# Patient Record
Sex: MALE | Race: WHITE | NOT HISPANIC OR LATINO | Employment: OTHER | URBAN - METROPOLITAN AREA
[De-identification: names, ages, dates, MRNs, and addresses within clinical notes are randomized per-mention and may not be internally consistent; named-entity substitution may affect disease eponyms.]

---

## 2019-07-17 ENCOUNTER — OFFICE VISIT (OUTPATIENT)
Dept: GASTROENTEROLOGY | Facility: CLINIC | Age: 79
End: 2019-07-17
Payer: MEDICARE

## 2019-07-17 VITALS
SYSTOLIC BLOOD PRESSURE: 150 MMHG | WEIGHT: 193 LBS | BODY MASS INDEX: 29.25 KG/M2 | DIASTOLIC BLOOD PRESSURE: 90 MMHG | HEART RATE: 110 BPM | HEIGHT: 68 IN

## 2019-07-17 DIAGNOSIS — R19.7 DIARRHEA, UNSPECIFIED TYPE: Primary | ICD-10-CM

## 2019-07-17 DIAGNOSIS — K21.9 GASTROESOPHAGEAL REFLUX DISEASE, ESOPHAGITIS PRESENCE NOT SPECIFIED: Primary | ICD-10-CM

## 2019-07-17 DIAGNOSIS — R19.7 DIARRHEA, UNSPECIFIED TYPE: ICD-10-CM

## 2019-07-17 PROCEDURE — 99204 OFFICE O/P NEW MOD 45 MIN: CPT | Performed by: INTERNAL MEDICINE

## 2019-07-17 RX ORDER — TAMSULOSIN HYDROCHLORIDE 0.4 MG/1
CAPSULE ORAL
Refills: 3 | COMMUNITY
Start: 2019-07-15

## 2019-07-17 RX ORDER — LAMOTRIGINE 25 MG/1
25 TABLET ORAL DAILY
COMMUNITY

## 2019-07-17 RX ORDER — GLUCOSA SU 2KCL/CHONDROITIN SU 500-400 MG
CAPSULE ORAL DAILY
COMMUNITY

## 2019-07-17 RX ORDER — ATORVASTATIN CALCIUM 20 MG/1
20 TABLET, FILM COATED ORAL DAILY
COMMUNITY

## 2019-07-17 RX ORDER — MONTELUKAST SODIUM 4 MG/1
1 TABLET, CHEWABLE ORAL 2 TIMES DAILY
Refills: 10 | COMMUNITY
Start: 2019-06-18 | End: 2021-04-30

## 2019-07-17 RX ORDER — ASPIRIN 81 MG/1
81 TABLET ORAL DAILY
COMMUNITY

## 2019-07-17 RX ORDER — MULTIVITAMIN
1 TABLET ORAL DAILY
COMMUNITY

## 2019-07-17 NOTE — PROGRESS NOTES
[facility] - Outpatient Consultation  Justyna Keita 70 y o  male MRN: [id number]  Encounter: [id number]    ASSESSMENT AND PLAN:      1  Gastroesophageal reflux disease, esophagitis presence not specified  Presently controlled    2  Diarrhea, unspecified type  72-year-old male with 6 months of watery diarrhea 2-3 stools a day  No rectal bleeding no antibiotics  No improvement with colestipol last colonoscopy 2018 was negative but no biopsies  The differential diagnosis includes bacterial overgrowth which I think is most likely  Other possibilities would be microscopic colitis or giardiasis  There is a small chance this could be celiac or bile salt diarrhea but both of these have been considered in the past evaluated  I would recommend a breath test for small bowel intestinal bacterial overgrowth  As well as a colonoscopy with biopsy  If all these are negative I would consider giardiasis  Followup Appointment[de-identified]  6 weeks  ______________________________________________________________________    Chief Complaint   Patient presents with    Diarrhea     pt wants 2nd opinion    Abdominal Pain       HPI:     Justyna Keita is a 70y o  year old male who presents with 6 months of diarrhea  The history is a combination of his recollections as well as a review of the lumen his medical records from [facility]  He reports that about 6 months ago he began to develop 2-3 loose stools a day particularly in the morning and sometimes at night before going to bed  The stools are watery sometimes associated with crampy abdominal pain  No blood he has had no weight loss  He has noticed no association with eating  No antibiotics no fever sweats or chills  He has chronic gastroesophageal reflux and a history of gastroparesis but these symptoms have largely been controlled he has had no nausea  He has a trial of colestipol without any improvement        Historical Information   Past Medical History:   Diagnosis Date    Gastroparesis     GERD (gastroesophageal reflux disease)     Hyperlipidemia     Hypertension      Past Surgical History:   Procedure Laterality Date    CHOLECYSTECTOMY      GASTRIC FUNDOPLICATION       Social History     Substance and Sexual Activity   Alcohol Use Not Currently     Social History     Substance and Sexual Activity   Drug Use Not on file     Social History     Tobacco Use   Smoking Status Never Smoker   Smokeless Tobacco Never Used     Family History   Problem Relation Age of Onset    Dementia Mother     Stroke Father        Meds/Allergies     Current Outpatient Medications:     aspirin (ECOTRIN LOW STRENGTH) 81 mg EC tablet    atorvastatin (LIPITOR) 20 mg tablet    Coenzyme Q10 (CO Q10) 100 MG CAPS    colestipol (COLESTID) 1 g tablet    lamoTRIgine (LaMICtal) 25 mg tablet    Multiple Vitamin (MULTIVITAMIN) tablet    tamsulosin (FLOMAX) 0 4 mg    No Known Allergies    PHYSICAL EXAM:    Blood pressure 150/90, pulse (!) 110, height 5' 8" (1 727 m), weight 87 5 kg (193 lb)  Body mass index is 29 35 kg/m²  General Appearance: NAD, cooperative, alert  HEENT:  Normocephalic, atraumatic, anicteric  Neck:  Supple, symmetrical, trachea midline  Lungs:  Clear to auscultation bilaterally; no rales, rhonchi or wheezing; respirations unlabored   Heart[de-identified]  Regular rate and rhythm; no murmur, rub, or gallop    Abdomen:  Soft, non-tender, non-distended; normal bowel sounds; no masses, no organomegaly   Rectal:  Deferred   Extremities: No cyanosis, clubbing or edema   Skin:  No jaundice, rashes, or lesions   Lymph nodes: No palpable cervical lymphadenopathy  Psych: Normal affect, good eye contact  Neuro: No gross deficits, AAOx3    Lab Results:   No results found for: WBC, HGB, HCT, MCV, PLT  No results found for: NA, K, CL, CO2, ANIONGAP, BUN, CREATININE, GLUCOSE, GLUF, CALCIUM, CORRECTEDCA, AST, ALT, ALKPHOS, PROT, BILITOT, EGFR  No results found for: IRON, TIBC, FERRITIN  No results found for: LIPASE    Radiology Results:   No results found  REVIEW OF SYSTEMS:    CONSTITUTIONAL: Denies any fever, chills, rigors, and weight loss  HEENT: No earache or tinnitus  Denies hearing loss or visual disturbances  CARDIOVASCULAR: No chest pain or palpitations  RESPIRATORY: Denies any cough, hemoptysis, shortness of breath or dyspnea on exertion  GASTROINTESTINAL: As noted in the History of Present Illness  GENITOURINARY: No problems with urination  Denies any hematuria or dysuria  NEUROLOGIC: No dizziness or vertigo, denies headaches  MUSCULOSKELETAL: Denies any muscle or joint pain  SKIN: Denies skin rashes or itching  ENDOCRINE: Denies excessive thirst  Denies intolerance to heat or cold  PSYCHOSOCIAL: Denies depression or anxiety  Denies any recent memory loss

## 2019-07-18 ENCOUNTER — CLINICAL SUPPORT (OUTPATIENT)
Dept: GASTROENTEROLOGY | Facility: CLINIC | Age: 79
End: 2019-07-18
Payer: MEDICARE

## 2019-07-18 DIAGNOSIS — R19.7 DIARRHEA, UNSPECIFIED TYPE: Primary | ICD-10-CM

## 2019-07-18 PROCEDURE — 91065 BREATH HYDROGEN/METHANE TEST: CPT | Performed by: INTERNAL MEDICINE

## 2019-07-18 NOTE — PROGRESS NOTES
[facility]  [address] [place] [address]   [phone]    Bacterial Overgrowth Analytical Record    Cristian Dias 70 y o  male MRN: [id number]      Date of Test: 07/18/2019    Substrate Given: Lactulose     Ordering Provider: Dr Sulaiman Childress Assistant: All    Symptoms: Diarrhea    The patient presents for bacterial overgrowth testing  Patient fasted overnight  Baseline readings obtained  substrate was administered, and the patient drink without difficulty  Breath test performed every 20 min for a total of 3 hr    Sample Clock Time ppmH2 ppmCH4 Co2% Jesús   Baseline 08:47am   34 36 4 9 1 12   #1  20 minutes 09:12am 34 43 5 3 1 03   #2  40 minutes 09:33am 40 51 4 7 1 17   #3  60 minutes 09:50am 40 44 3 7 1 48   #4  80 minutes 10:10am 38 55 4 0 1 37   #5  100 minutes 10:30am 32 53 4 7 1 17   #6  120 minutes 10:50am 28 46 4 4 1 25   #7  140 minutes 11:10am 30 46 4 3 1 27   #8  160 minutes 11:28am 33 52 4 6 1 19   #9  180 minutes 11:46am 28 43 3 7 1 48       Physician interpretation:  Equivocal, difficult to interpret with high baseline    Given the high pretest likelihood will likely treat with rifaximin

## 2019-07-18 NOTE — LETTER
July 24, 2019     Sarah Rodríguez MD  [address]    Patient: Abdirashid Evans   YOB: 1940   Date of Visit: 7/18/2019       Dear Dr Box [place]:    Thank you for referring Abdirashid Evans to me for evaluation  Below are my notes for this consultation  If you have questions, please do not hesitate to call me  I look forward to following your patient along with you  Sincerely,        Jay Saha        CC: No Recipients  Cherie Ruiz MD  7/24/2019  5:24 PM  Incomplete  [address] [facility]  [address] [facility] [address]   [phone]    Bacterial Overgrowth Analytical Record    Abdirashid Evans 70 y o  male MRN: [id number]      Date of Test: 07/18/2019    Substrate Given: Lactulose     Ordering Provider: Dr Asher Mcgraw Assistant: All    Symptoms: Diarrhea    The patient presents for bacterial overgrowth testing  Patient fasted overnight  Baseline readings obtained  substrate was administered, and the patient drink without difficulty  Breath test performed every 20 min for a total of 3 hr    Sample Clock Time ppmH2 ppmCH4 Co2% Jesús   Baseline 08:47am   34 36 4 9 1 12   #1  20 minutes 09:12am 34 43 5 3 1 03   #2  40 minutes 09:33am 40 51 4 7 1 17   #3  60 minutes 09:50am 40 44 3 7 1 48   #4  80 minutes 10:10am 38 55 4 0 1 37   #5  100 minutes 10:30am 32 53 4 7 1 17   #6  120 minutes 10:50am 28 46 4 4 1 25   #7  140 minutes 11:10am 30 46 4 3 1 27   #8  160 minutes 11:28am 33 52 4 6 1 19   #9  180 minutes 11:46am 28 43 3 7 1 48       Physician interpretation:  Equivocal, difficult to interpret with high baseline    Given the high pretest likelihood will likely treat with rifaximin        Cherie Ruiz MD  7/24/2019  5:20 PM  Sign at close encounter  [facility]  [address] [place] [id number]   [phone]    Bacterial Overgrowth Analytical Record    Abdirashid Evans 70 y o  male MRN: [id number]      Date of Test: 07/18/2019    Substrate Given: Lactulose     Ordering Provider: Dr Asher Mcgraw Assistant: All    Symptoms: Diarrhea    The patient presents for bacterial overgrowth testing  Patient fasted overnight  Baseline readings obtained  substrate was administered, and the patient drink without difficulty    Breath test performed every 20 min for a total of 3 hr    Sample Clock Time ppmH2 ppmCH4 Co2% Jesús   Baseline 08:47am   34 36 4 9 1 12   #1  20 minutes 09:12am 34 43 5 3 1 03   #2  40 minutes 09:33am 40 51 4 7 1 17   #3  60 minutes 09:50am 40 44 3 7 1 48   #4  80 minutes 10:10am 38 55 4 0 1 37   #5  100 minutes 10:30am 32 53 4 7 1 17   #6  120 minutes 10:50am 28 46 4 4 1 25   #7  140 minutes 11:10am 30 46 4 3 1 27   #8  160 minutes 11:28am 33 52 4 6 1 19   #9  180 minutes 11:46am 28 43 3 7 1 48       Physician interpretation:

## 2019-07-25 ENCOUNTER — LAB REQUISITION (OUTPATIENT)
Dept: LAB | Facility: HOSPITAL | Age: 79
End: 2019-07-25
Payer: MEDICARE

## 2019-07-25 DIAGNOSIS — K64.0 FIRST DEGREE HEMORRHOIDS: ICD-10-CM

## 2019-07-25 PROCEDURE — 88305 TISSUE EXAM BY PATHOLOGIST: CPT | Performed by: PATHOLOGY

## 2019-07-26 DIAGNOSIS — R19.7 DIARRHEA, UNSPECIFIED TYPE: Primary | ICD-10-CM

## 2019-07-26 NOTE — TELEPHONE ENCOUNTER
Pt's wife states [facility] said Ins will not cover the med Xifaxan; 28 pills are $1100; requests alternative   CB#  [phone]

## 2019-07-31 RX ORDER — AMOXICILLIN AND CLAVULANATE POTASSIUM 875; 125 MG/1; MG/1
1 TABLET, FILM COATED ORAL EVERY 12 HOURS SCHEDULED
Qty: 20 TABLET | Refills: 0 | Status: SHIPPED | OUTPATIENT
Start: 2019-07-31 | End: 2019-08-10

## 2019-07-31 NOTE — TELEPHONE ENCOUNTER
Pt's wife Renae left  mssg stating the 28 pills were $1100 and they thought there might be an alternative; ask for [phone]

## 2019-11-12 ENCOUNTER — TELEPHONE (OUTPATIENT)
Dept: GASTROENTEROLOGY | Facility: CLINIC | Age: 79
End: 2019-11-12

## 2019-11-12 NOTE — TELEPHONE ENCOUNTER
I called and left a vm suggesting they call tomorrow and schedule an office visit with Mel Browne  He was last seen for the colonoscopy in July, never had follow up  He was treated for SIBO with xifaxin; he may require another course of treatment  Again emphasized he should call to schedule for a sooner appt

## 2019-11-12 NOTE — TELEPHONE ENCOUNTER
Pt's wife Taylor Denise states he continues to experience abdominal pain every day all day long/mostly in the mornings  Noted Pt last seen in July; questioned her for info/she was vague; states he is not good at describing info; recommended making an appt/states could see NP or other Dr if it was a while to get in to [facility]  Call transf'd for OV  Pulled info up to see if appt had been made  Pt appt is not made until January    Reopening TE to send to clinical for questioning since she stated he has pain every day

## 2019-11-14 ENCOUNTER — OFFICE VISIT (OUTPATIENT)
Dept: GASTROENTEROLOGY | Facility: CLINIC | Age: 79
End: 2019-11-14
Payer: MEDICARE

## 2019-11-14 VITALS
SYSTOLIC BLOOD PRESSURE: 152 MMHG | BODY MASS INDEX: 29.55 KG/M2 | HEIGHT: 68 IN | HEART RATE: 86 BPM | DIASTOLIC BLOOD PRESSURE: 100 MMHG | WEIGHT: 195 LBS

## 2019-11-14 DIAGNOSIS — K21.9 GASTROESOPHAGEAL REFLUX DISEASE, ESOPHAGITIS PRESENCE NOT SPECIFIED: ICD-10-CM

## 2019-11-14 DIAGNOSIS — R10.32 LLQ PAIN: ICD-10-CM

## 2019-11-14 DIAGNOSIS — R14.0 ABDOMINAL BLOATING: ICD-10-CM

## 2019-11-14 DIAGNOSIS — R10.30 LOWER ABDOMINAL PAIN: ICD-10-CM

## 2019-11-14 DIAGNOSIS — R19.7 DIARRHEA, UNSPECIFIED TYPE: Primary | ICD-10-CM

## 2019-11-14 DIAGNOSIS — R19.4 RECENT CHANGE IN FREQUENCY OF BOWEL MOVEMENTS: ICD-10-CM

## 2019-11-14 PROCEDURE — 99214 OFFICE O/P EST MOD 30 MIN: CPT | Performed by: NURSE PRACTITIONER

## 2019-11-14 RX ORDER — MEMANTINE HYDROCHLORIDE 10 MG/1
10 TABLET ORAL 2 TIMES DAILY
COMMUNITY

## 2019-11-14 RX ORDER — DIPHENOXYLATE HYDROCHLORIDE AND ATROPINE SULFATE 2.5; .025 MG/1; MG/1
1 TABLET ORAL 4 TIMES DAILY PRN
Qty: 60 TABLET | Refills: 2 | Status: SHIPPED | OUTPATIENT
Start: 2019-11-14 | End: 2019-12-10 | Stop reason: SDUPTHER

## 2019-11-14 NOTE — PROGRESS NOTES
[facility] - Outpatient Follow-up Note  Cassandra Shown 70 y o  male MRN: [id number]  Encounter: [id number]    ASSESSMENT AND PLAN:      1  Diarrhea, unspecified type  2  Recent change in frequency of bowel movements  3  Abdominal bloating  4  LLQ pain  5  Lower abdominal pain  Two week history of increased frequency of watery diarrhea with lower abdominal pain localized to the L LQ  Although no diverticula were seen on previous colonoscopy, diverticulitis is a possibility as well as infectious or ischemic colitis, less likely microscopic colitis with negative biopsy per colonoscopy in July  Thyroid dysfunction is also a possibility as well as a viral gastroenteritis or other infectious etiology  Will check some labs including TSH  Stool studies including Giardia (previous consideration/concern per Dr Padmini Centeno), C diff, although lower suspicion and fecal calprotectin  Will also schedule CT of abdomen and pelvis with contrast since his recent creatinine level has normalized  Advised to maintain adequate oral fluid intake daily since he is having some periods of dizziness which may be due to dehydration with increased diarrhea  Will add Lomotil to help control stools that interfere with his daily activity  Further plan pending outcome of testing  This patient is average risk  His last colonoscopy was 07/25/2019 with normal colonic mucosa and grade 1 internal hemorrhoids  Pathology was negative for colitis or dysplasia  No recall advised for advanced age     - TSH, 3rd generation with Free T4 reflex; Future  - MISCELLANEOUS LAB TEST; Future  - Giardia antigen; Future  - Calprotectin,Fecal; Future  - Ova and parasite examination; Future  - Clostridium difficile toxin by PCR; Future  - CBC; Future  - CT abdomen pelvis w wo contrast; Future  - Comprehensive metabolic panel; Future  - diphenoxylate-atropine (LOMOTIL) 2 5-0 025 mg per tablet;  Take 1 tablet by mouth 4 (four) times a day as needed for diarrhea  Dispense: 60 tablet; Refill: 2    6  Gastroesophageal reflux disease, esophagitis presence not specified  Previous symptoms are well controlled with lifestyle modifications  Followup Appointment:  2 weeks  ______________________________________________________________________    Chief Complaint   Patient presents with    Diarrhea    Left abd pain     HPI:  51-year-old male patient who was recently diagnosed with dementia, started on Namenda, returns to the office, accompanied by his wife with worsening diarrhea with L LQ, lower abdominal pain over the past 2 weeks  The patient has a long history of chronic diarrhea which may be bile salt in nature, described as 2-3 watery stools daily  Two weeks ago the patient began experiencing continue lower abdominal pain localized to the L LQ described as dull rated 6 to 7/10  No particular aggravating or alleviating factors  At the same time, began having 3-5 urgent watery stools accompanied with abdominal bloating which has continued  Denies fever/chills, nausea or vomiting, recent antibiotics, travel or sick contacts  No dietary changes but did recently start Namenda for his dementia with 1 possible side effect as diarrhea  He was recently treated with Xifaxan for an equivocal breath test study but this did not change his usual stool character  Has not been taking the Colestid as he does not feel it helped  Denies melena, hematochezia, rectal pain, fecal incontinence or nocturnal stools  Denies fatigue, chest pain, dysphagia, odynophagia, heartburn, reflux or early satiety  Appetite is normal   Weight is stable      Historical Information   Past Medical History:   Diagnosis Date    Dementia ([facility] [address] )     Gastroparesis     GERD (gastroesophageal reflux disease)     Hiatal hernia     Hyperlipidemia     Hypertension     Trigeminal neuralgia of left side of face     from injury     Past Surgical History:   Procedure Laterality Date    CHOLECYSTECTOMY      COLONOSCOPY  07/25/2019    Grade 1 internal hemorrhoids with normal colonic mucosa  Pathology negative for colitis or dysplasia    GASTRIC FUNDOPLICATION      UPPER GASTROINTESTINAL ENDOSCOPY       Social History     Substance and Sexual Activity   Alcohol Use Not Currently    Frequency: Never    Binge frequency: Never     Social History     Substance and Sexual Activity   Drug Use Never     Social History     Tobacco Use   Smoking Status Never Smoker   Smokeless Tobacco Never Used     Family History   Problem Relation Age of Onset    Dementia Mother     Stroke Father     Colon polyps Neg Hx     Colon cancer Neg Hx          Current Outpatient Medications:     aspirin (ECOTRIN LOW STRENGTH) 81 mg EC tablet    atorvastatin (LIPITOR) 20 mg tablet    Coenzyme Q10 (CO Q10) 100 MG CAPS    lamoTRIgine (LaMICtal) 25 mg tablet    memantine (NAMENDA) 10 mg tablet    Multiple Vitamin (MULTIVITAMIN) tablet    tamsulosin (FLOMAX) 0 4 mg    colestipol (COLESTID) 1 g tablet    diphenoxylate-atropine (LOMOTIL) 2 5-0 025 mg per tablet  No Known Allergies  Reviewed medications and allergies and updated as indicated    ROS:  All systems reviewed and normals as noted per HPI  Positives also as listed; reports change in bowel habits, painful joints, swollen joints, leg cramps, chronic pain, itching, tingling/numbness  All other systems were reported as negative  PHYSICAL EXAM:    Blood pressure 152/100, pulse 86, height 5' 8" (1 727 m), weight 88 5 kg (195 lb)  Body mass index is 29 65 kg/m²  General Appearance: NAD, cooperative, alert, forgetful  Head:  Normocephalic, atraumatic  Eyes: Anicteric, PERRLA, EOMI  ENT:   normal external appearance, normal mucosa  Neck:  Supple, symmetrical, trachea midline  Resp:  Clear to auscultation bilaterally; no rales, rhonchi or wheezing; respirations unlabored   CV:  S1 S2, Regular rate and rhythm; no murmur, rub, or gallop    GI: Soft, tenderness in L LQ, across lower abdomen and RUQ with palpation, non-distended; normal bowel sounds; no masses, no organomegaly   Rectal: Deferred  Musculoskeletal: No cyanosis, clubbing or edema  Normal ROM  Skin:  No jaundice, rashes, or lesions   Heme/Lymph: No palpable cervical lymphadenopathy  Psych: Normal affect, good eye contact  Neuro: No gross deficits, AAOx3    Lab Results:  Reviewed lab results from October or November, 2019  No results found for: WBC, HGB, HCT, MCV, PLT  No results found for: NA, K, CL, CO2, ANIONGAP, BUN, CREATININE, GLUCOSE, GLUF, CALCIUM, CORRECTEDCA, AST, ALT, ALKPHOS, PROT, BILITOT, EGFR  No results found for: IRON, TIBC, FERRITIN  No results found for: LIPASE    Radiology Results:   No results found

## 2019-11-14 NOTE — PATIENT INSTRUCTIONS
Get the stool studies done then you may use the Lomotil as needed for diarrhea that interferes with her daily activities  Try doing a a liquid, bland diet for now  Make sure you are drinking plenty of fluids/water every day

## 2019-12-10 ENCOUNTER — OFFICE VISIT (OUTPATIENT)
Dept: GASTROENTEROLOGY | Facility: CLINIC | Age: 79
End: 2019-12-10
Payer: MEDICARE

## 2019-12-10 VITALS
HEIGHT: 68 IN | SYSTOLIC BLOOD PRESSURE: 118 MMHG | DIASTOLIC BLOOD PRESSURE: 68 MMHG | HEART RATE: 94 BPM | WEIGHT: 193 LBS | BODY MASS INDEX: 29.25 KG/M2

## 2019-12-10 DIAGNOSIS — R14.0 ABDOMINAL BLOATING: ICD-10-CM

## 2019-12-10 DIAGNOSIS — R19.7 DIARRHEA, UNSPECIFIED TYPE: Primary | ICD-10-CM

## 2019-12-10 DIAGNOSIS — R10.32 LLQ PAIN: ICD-10-CM

## 2019-12-10 DIAGNOSIS — R10.9 RIGHT SIDED ABDOMINAL PAIN: ICD-10-CM

## 2019-12-10 DIAGNOSIS — R10.30 LOWER ABDOMINAL PAIN: ICD-10-CM

## 2019-12-10 DIAGNOSIS — R19.4 RECENT CHANGE IN FREQUENCY OF BOWEL MOVEMENTS: ICD-10-CM

## 2019-12-10 DIAGNOSIS — K21.9 GASTROESOPHAGEAL REFLUX DISEASE, ESOPHAGITIS PRESENCE NOT SPECIFIED: ICD-10-CM

## 2019-12-10 PROCEDURE — 99214 OFFICE O/P EST MOD 30 MIN: CPT | Performed by: NURSE PRACTITIONER

## 2019-12-10 RX ORDER — DIPHENOXYLATE HYDROCHLORIDE AND ATROPINE SULFATE 2.5; .025 MG/1; MG/1
1 TABLET ORAL 4 TIMES DAILY PRN
Qty: 60 TABLET | Refills: 2 | Status: SHIPPED | OUTPATIENT
Start: 2019-12-10 | End: 2020-10-16

## 2019-12-10 NOTE — PATIENT INSTRUCTIONS
Continue to use the Lomotil for diarrhea that interferes with  your daily activities   you will have another colonoscopy here at our endoscopy center

## 2019-12-10 NOTE — PROGRESS NOTES
[facility] - Outpatient Follow-up Note  Noel Cortez 70 y o  male MRN: [id number]  Encounter: [id number]    ASSESSMENT AND PLAN:      1  Diarrhea, unspecified type  2  Recent change in frequency of bowel movements  3  Abdominal bloating  4  Right sided abdominal pain  5  Lower abdominal pain  7  LLQ pain  Continues with 4 to 6+ liquid stools daily  Stools taper off in the afternoon with the use of Lomotil so we will continue this  Stool studies were all negative including C diff, Giardia  TSH was normal   CT scan showed no evidence of diverticulitis, colitis, bowel inflammation, appendicitis  Previous breath testing was negative for SIBO  No pathology was seen on previous colonoscopy in July however fecal calprotectin was 942 6 when checked  This raises suspicion for IBD  Microscopic colitis is also a consideration  Case and plan was discussed with Dr Jerry Wakefield  Will repeat colonoscopy- EC     - diphenoxylate-atropine (LOMOTIL) 2 5-0 025 mg per tablet; Take 1 tablet by mouth 4 (four) times a day as needed for diarrhea  Dispense: 60 tablet; Refill: 2    6  Gastroesophageal reflux disease, esophagitis presence not specified  Previous symptoms are well controlled with lifestyle modifications  Followup Appointment:  4 weeks  ______________________________________________________________________    Chief Complaint   Patient presents with    Follow-up     labs/ct scan      HPI:  22-year-old male patient, recently diagnosed with dementia, taking them endo, returns to the office, accompanied by his wife, to follow up his worsening diarrhea which persists  The patient reports having at least 4-6 liquid stools daily beginning upon awakening  No relation to any food triggers or meals  The stool frequency has decreased throughout the day with the use of Lomotil  Denies melena, hematochezia, chest pain, UGI or alarm symptoms    The patient states he feels horrible noting weakness, feeling feverish with arthralgias  He also reports having a lot of flatus and feels somewhat bloated  No fevers or chills  Denies nausea or vomiting  Appetite is normal   Weight is stable  Historical Information   Past Medical History:   Diagnosis Date    Dementia ([address] )     Gastroparesis     GERD (gastroesophageal reflux disease)     Hiatal hernia     Hyperlipidemia     Hypertension     Trigeminal neuralgia of left side of face     from injury     Past Surgical History:   Procedure Laterality Date    CHOLECYSTECTOMY      COLONOSCOPY  07/25/2019    Grade 1 internal hemorrhoids with normal colonic mucosa  Pathology negative for colitis or dysplasia    GASTRIC FUNDOPLICATION      UPPER GASTROINTESTINAL ENDOSCOPY       Social History     Substance and Sexual Activity   Alcohol Use Not Currently    Frequency: Never    Binge frequency: Never     Social History     Substance and Sexual Activity   Drug Use Never     Social History     Tobacco Use   Smoking Status Never Smoker   Smokeless Tobacco Never Used     Family History   Problem Relation Age of Onset    Dementia Mother     Stroke Father     Colon polyps Neg Hx     Colon cancer Neg Hx          Current Outpatient Medications:     aspirin (ECOTRIN LOW STRENGTH) 81 mg EC tablet    atorvastatin (LIPITOR) 20 mg tablet    Coenzyme Q10 (CO Q10) 100 MG CAPS    colestipol (COLESTID) 1 g tablet    diphenoxylate-atropine (LOMOTIL) 2 5-0 025 mg per tablet    lamoTRIgine (LaMICtal) 25 mg tablet    memantine (NAMENDA) 10 mg tablet    Multiple Vitamin (MULTIVITAMIN) tablet    tamsulosin (FLOMAX) 0 4 mg  No Known Allergies  Reviewed medications and allergies and updated as indicated    PHYSICAL EXAM:    Blood pressure 118/68, pulse 94, height 5' 8" (1 727 m), weight 87 5 kg (193 lb)  Body mass index is 29 35 kg/m²    General Appearance: NAD, cooperative, alert  Eyes: Anicteric, PERRLA, EOMI  ENT:  Normocephalic, atraumatic, normal mucosa  Neck:  Supple, symmetrical, trachea midline  Resp:  Clear to auscultation bilaterally; no rales, rhonchi or wheezing; respirations unlabored   CV:  S1 S2, Regular rate and rhythm; no murmur, rub, or gallop  GI:  Soft, non-tender, non-distended; normal bowel sounds; no masses, no organomegaly   Rectal: Deferred  Musculoskeletal: No cyanosis, clubbing or edema  Normal ROM  Skin:  No jaundice, rashes, or lesions   Heme/Lymph: No palpable cervical lymphadenopathy  Psych: Normal affect, good eye contact  Neuro: No gross deficits, AAOx3    Lab Results:   No results found for: WBC, HGB, HCT, MCV, PLT  No results found for: NA, K, CL, CO2, ANIONGAP, BUN, CREATININE, GLUCOSE, GLUF, CALCIUM, CORRECTEDCA, AST, ALT, ALKPHOS, PROT, BILITOT, EGFR  No results found for: IRON, TIBC, FERRITIN  No results found for: LIPASE    Radiology Results:   No results found

## 2019-12-16 ENCOUNTER — TELEPHONE (OUTPATIENT)
Dept: GASTROENTEROLOGY | Facility: CLINIC | Age: 79
End: 2019-12-16

## 2019-12-16 NOTE — TELEPHONE ENCOUNTER
Pt left voicemail through answering service, needs to reschedule office visit on 01/09/2020   Please call back on #[phone]

## 2019-12-27 PROCEDURE — 88305 TISSUE EXAM BY PATHOLOGIST: CPT | Performed by: PATHOLOGY

## 2019-12-28 ENCOUNTER — LAB REQUISITION (OUTPATIENT)
Dept: LAB | Facility: HOSPITAL | Age: 79
End: 2019-12-28
Payer: MEDICARE

## 2019-12-28 DIAGNOSIS — K64.0 FIRST DEGREE HEMORRHOIDS: ICD-10-CM

## 2019-12-28 DIAGNOSIS — R19.7 DIARRHEA, UNSPECIFIED: ICD-10-CM

## 2019-12-28 DIAGNOSIS — K57.30 DIVERTICULOSIS OF LARGE INTESTINE WITHOUT PERFORATION OR ABSCESS WITHOUT BLEEDING: ICD-10-CM

## 2019-12-28 DIAGNOSIS — R10.32 LEFT LOWER QUADRANT PAIN: ICD-10-CM

## 2020-01-08 NOTE — RESULT ENCOUNTER NOTE
I spoke with the patient regarding the results which are essentially negative  No recall given age  ** still having pain and diarrhea  Not taking Lomotil  Can we move up his office visit so he can be seen in the next 1-2 weeks?   I think that is the next step is an office visit thanks

## 2020-01-09 ENCOUNTER — OFFICE VISIT (OUTPATIENT)
Dept: GASTROENTEROLOGY | Facility: CLINIC | Age: 80
End: 2020-01-09

## 2020-01-09 VITALS
HEART RATE: 73 BPM | HEIGHT: 68 IN | WEIGHT: 196 LBS | BODY MASS INDEX: 29.7 KG/M2 | SYSTOLIC BLOOD PRESSURE: 140 MMHG | DIASTOLIC BLOOD PRESSURE: 86 MMHG

## 2020-01-09 DIAGNOSIS — R14.0 ABDOMINAL BLOATING: ICD-10-CM

## 2020-01-09 DIAGNOSIS — Z12.11 SCREENING FOR COLON CANCER: ICD-10-CM

## 2020-01-09 DIAGNOSIS — K21.9 GASTROESOPHAGEAL REFLUX DISEASE, ESOPHAGITIS PRESENCE NOT SPECIFIED: ICD-10-CM

## 2020-01-09 DIAGNOSIS — R19.7 DIARRHEA, UNSPECIFIED TYPE: Primary | ICD-10-CM

## 2020-01-09 PROCEDURE — 99214 OFFICE O/P EST MOD 30 MIN: CPT | Performed by: INTERNAL MEDICINE

## 2020-01-09 RX ORDER — CIPROFLOXACIN 500 MG/1
500 TABLET, FILM COATED ORAL EVERY 12 HOURS SCHEDULED
Qty: 28 TABLET | Refills: 0 | Status: SHIPPED | OUTPATIENT
Start: 2020-01-09 | End: 2020-01-23

## 2020-01-09 NOTE — PROGRESS NOTES
[id number] - Outpatient Follow-up Note  Aldair Vasquez 70 y o  male MRN: [id number]  Encounter: [id number]    ASSESSMENT AND PLAN:      1  Diarrhea, unspecified type  Negative colonoscopy including biopsy just done  Hi fecal calprotectin previous breath test was equivocal but he did not respond Augmentin  I am concerned that we are missing something here  I feel confident that there is no pathology in the colon  This is his 3rd colonoscopy in a year and a half  Small bowel bacterial overgrowth would fit with the clinical picture I have elected to try a different broader spectrum antibiotic I have also ordered a small-bowel follow-through  I have also ordered celiac studies  - ciprofloxacin (CIPRO) 500 mg tablet; Take 1 tablet (500 mg total) by mouth every 12 (twelve) hours for 14 days  Dispense: 28 tablet; Refill: 0  - FL small bowel; Future  - Celiac Disease Comprehensive Panel; Future    2  Abdominal bloating  As above    3  Gastroesophageal reflux disease, esophagitis presence not specified  Stable asymptomatic    4  Screening for colon cancer  Negative colonoscopy just done      Followup Appointment:  3 months  ______________________________________________________________________    Chief Complaint   Patient presents with    Follow-up     scope      HPI:  Continues with 3 loose stools every morning somewhat watery  After that he is fine  Some abdominal bloating  He is eating weight stable appetite good  Historical Information   Past Medical History:   Diagnosis Date    Dementia ([address] )     Gastroparesis     GERD (gastroesophageal reflux disease)     Hiatal hernia     Hyperlipidemia     Hypertension     Trigeminal neuralgia of left side of face     from injury     Past Surgical History:   Procedure Laterality Date    CHOLECYSTECTOMY      COLONOSCOPY  07/25/2019    Grade 1 internal hemorrhoids with normal colonic mucosa    Pathology negative for colitis or dysplasia    GASTRIC FUNDOPLICATION      UPPER GASTROINTESTINAL ENDOSCOPY       Social History     Substance and Sexual Activity   Alcohol Use Not Currently    Frequency: 4 or more times a week    Binge frequency: Never     Social History     Substance and Sexual Activity   Drug Use Never     Social History     Tobacco Use   Smoking Status Never Smoker   Smokeless Tobacco Never Used     Family History   Problem Relation Age of Onset    Dementia Mother     Stroke Father     Colon polyps Neg Hx     Colon cancer Neg Hx          Current Outpatient Medications:     aspirin (ECOTRIN LOW STRENGTH) 81 mg EC tablet    atorvastatin (LIPITOR) 20 mg tablet    Coenzyme Q10 (CO Q10) 100 MG CAPS    colestipol (COLESTID) 1 g tablet    diphenoxylate-atropine (LOMOTIL) 2 5-0 025 mg per tablet    lamoTRIgine (LaMICtal) 25 mg tablet    memantine (NAMENDA) 10 mg tablet    Multiple Vitamin (MULTIVITAMIN) tablet    tamsulosin (FLOMAX) 0 4 mg    ciprofloxacin (CIPRO) 500 mg tablet    polyethylene glycol (COLYTE) 4000 mL solution  No Known Allergies  Reviewed medications and allergies and updated as indicated    PHYSICAL EXAM:    Blood pressure 140/86, pulse 73, height 5' 8" (1 727 m), weight 88 9 kg (196 lb)  Body mass index is 29 8 kg/m²  General Appearance: NAD, cooperative, alert  Eyes: Anicteric, PERRLA, EOMI  ENT:  Normocephalic, atraumatic, normal mucosa  Neck:  Supple, symmetrical, trachea midline  Rectal: Deferred  Musculoskeletal: No cyanosis, clubbing or edema  Normal ROM  Skin:  No jaundice, rashes, or lesions   Psych: Normal affect, good eye contact  Neuro: No gross deficits, AAOx3    Lab Results:   No results found for: WBC, HGB, HCT, MCV, PLT  No results found for: NA, K, CL, CO2, ANIONGAP, BUN, CREATININE, GLUCOSE, GLUF, CALCIUM, CORRECTEDCA, AST, ALT, ALKPHOS, PROT, BILITOT, EGFR  No results found for: IRON, TIBC, FERRITIN  No results found for: LIPASE    Radiology Results:   No results found

## 2020-02-12 ENCOUNTER — OFFICE VISIT (OUTPATIENT)
Dept: GASTROENTEROLOGY | Facility: CLINIC | Age: 80
End: 2020-02-12
Payer: MEDICARE

## 2020-02-12 ENCOUNTER — TELEPHONE (OUTPATIENT)
Dept: GASTROENTEROLOGY | Facility: CLINIC | Age: 80
End: 2020-02-12

## 2020-02-12 VITALS
BODY MASS INDEX: 29.86 KG/M2 | DIASTOLIC BLOOD PRESSURE: 96 MMHG | HEIGHT: 68 IN | HEART RATE: 99 BPM | WEIGHT: 197 LBS | SYSTOLIC BLOOD PRESSURE: 138 MMHG

## 2020-02-12 DIAGNOSIS — R10.32 LLQ ABDOMINAL PAIN: Primary | ICD-10-CM

## 2020-02-12 DIAGNOSIS — R19.7 DIARRHEA, UNSPECIFIED TYPE: ICD-10-CM

## 2020-02-12 DIAGNOSIS — Z12.11 COLON CANCER SCREENING: ICD-10-CM

## 2020-02-12 PROCEDURE — 99214 OFFICE O/P EST MOD 30 MIN: CPT | Performed by: NURSE PRACTITIONER

## 2020-02-12 RX ORDER — DICYCLOMINE HYDROCHLORIDE 10 MG/1
10 CAPSULE ORAL 2 TIMES DAILY PRN
Qty: 60 CAPSULE | Refills: 2 | Status: SHIPPED | OUTPATIENT
Start: 2020-02-12 | End: 2020-10-16

## 2020-02-12 RX ORDER — AMITRIPTYLINE HYDROCHLORIDE 10 MG/1
10 TABLET, FILM COATED ORAL
Qty: 30 TABLET | Refills: 1 | Status: SHIPPED | OUTPATIENT
Start: 2020-02-12 | End: 2021-04-30

## 2020-02-12 NOTE — PROGRESS NOTES
[id number] [facility] - Outpatient Follow-up Note  Milena Simental 68 y o  male MRN: [id number]  Encounter: [id number]    ASSESSMENT AND PLAN:      1  LLQ abdominal pain  Continues with LLQ abdominal pain  He reports that pain is worse after he moves his bowels  Patient had CT abdomen and pelvis in November 2019 which was negative for any acute pathology  He had a breath test in the past that was equivocal, completed a course of Augmentin without any improvement  When he was last seen with Dr Vivien Aceves on 01/09 he was prescribed Cipro  This did not improve his symptoms  Patient had colonoscopy on 12/27/19 which showed normal mucosa throughout the whole colon and biopsies were collected that were negative for microscopic colitis  Pt also had a small bowel follow through which showed prolonged transition time  Differential diagnoses include IBD (Crohn's disease)  - will check MRI enterography w wo; Future  - will trial dicyclomine (BENTYL) 10 mg capsule; Take 1 capsule (10 mg total) by mouth 2 (two) times a day as needed (abdominal cramping)  Dispense: 60 capsule; Refill: 2  - will trial amitriptyline (ELAVIL) 10 mg tablet; Take 1 tablet (10 mg total) by mouth daily at bedtime  Dispense: 30 tablet; Refill: 1  Discussed with pt that if he experiences any side effects to stop taking medication    - Basic metabolic panel; Future     2  Diarrhea, unspecified type  Continues with 1 episode of watery diarrhea in the morning  He states that abdominal pain worsen after he moves his bowels  He had stool studies completed in November that were negative  His fecal calprotectin was elevated at 942  Differential diagnoses include IBD (Crohn's disease)  -continue Lomotil b i d     3  Colon cancer screening  Last colonoscopy 12/27/2019  Showed grade 1 internal hemorrhoids and normal mucosa throughout the whole colon  No recall due to advanced age    Of note this is the patient's 3rd colonoscopy in the past year and a half  Case discussed with Dr Ann Bright  Followup Appointment: 4-6 weeks   ______________________________________________________________________    Chief Complaint   Patient presents with    LLQ pain    Diarrhea     sx continue    Hemorrhoids    Bloating/gas     sx worse in AM    Prostate pain     feels "achey"     HPI:  Sara Tapia is a 68y o  year old male who presents to the office today for continuation of left lower quadrant, diarrhea, bloating  Patient was recently seen in the office on 01/09 with Dr Mal Mccauley  He reports that his symptoms have not improved  He still is complaining of left lower quadrant abdominal pain that is constant and described as a dull ache  He also continues with watery diarrhea, has 1 loose bowel movement in the morning  He states that pain worsens after he moves his bowels  He denies any fever, chills, rectal bleeding, melena  Denies any upper GI symptoms  Denies any weight loss, and has actually experience weight gain over the past 2 weeks  His appetite is stable he does also report feeling "generally weak and shaky "       Historical Information   Past Medical History:   Diagnosis Date    Dementia ([address] )     Gastroparesis     GERD (gastroesophageal reflux disease)     Hiatal hernia     Hyperlipidemia     Hypertension     Trigeminal neuralgia of left side of face     from injury     Past Surgical History:   Procedure Laterality Date    CHOLECYSTECTOMY      COLONOSCOPY  07/25/2019    Grade 1 internal hemorrhoids with normal colonic mucosa    Pathology negative for colitis or dysplasia    GASTRIC FUNDOPLICATION      UPPER GASTROINTESTINAL ENDOSCOPY       Social History     Substance and Sexual Activity   Alcohol Use Not Currently    Frequency: 4 or more times a week    Binge frequency: Never     Social History     Substance and Sexual Activity   Drug Use Never     Social History     Tobacco Use   Smoking Status Never Smoker   Smokeless Tobacco Never Used     Family History   Problem Relation Age of Onset    Dementia Mother     Stroke Father     Colon polyps Neg Hx     Colon cancer Neg Hx          Current Outpatient Medications:     aspirin (ECOTRIN LOW STRENGTH) 81 mg EC tablet    atorvastatin (LIPITOR) 20 mg tablet    Coenzyme Q10 (CO Q10) 100 MG CAPS    colestipol (COLESTID) 1 g tablet    diphenoxylate-atropine (LOMOTIL) 2 5-0 025 mg per tablet    lamoTRIgine (LaMICtal) 25 mg tablet    memantine (NAMENDA) 10 mg tablet    Multiple Vitamin (MULTIVITAMIN) tablet    tamsulosin (FLOMAX) 0 4 mg    amitriptyline (ELAVIL) 10 mg tablet    dicyclomine (BENTYL) 10 mg capsule    polyethylene glycol (COLYTE) 4000 mL solution  No Known Allergies  Reviewed medications and allergies and updated as indicated    PHYSICAL EXAM:    Blood pressure 138/96, pulse 99, height 5' 8" (1 727 m), weight 89 4 kg (197 lb)  Body mass index is 29 95 kg/m²  General Appearance: NAD, cooperative, alert  Eyes: Anicteric, PERRLA, EOMI  ENT:  Normocephalic, atraumatic, normal mucosa  Neck:  Supple, symmetrical, trachea midline  Resp:  Clear to auscultation bilaterally; no rales, rhonchi or wheezing; respirations unlabored   CV:  S1 S2, Regular rate and rhythm; no murmur, rub, or gallop  GI:  Soft, non-tender, non-distended; normal bowel sounds; no masses, no organomegaly   Rectal: Deferred  Musculoskeletal: No cyanosis, clubbing or edema  Normal ROM  Skin:  No jaundice, rashes, or lesions   Heme/Lymph: No palpable cervical lymphadenopathy  Psych: Normal affect, good eye contact  Neuro: No gross deficits, AAOx3    Lab Results:   No results found for: WBC, HGB, HCT, MCV, PLT  No results found for: NA, K, CL, CO2, ANIONGAP, BUN, CREATININE, GLUCOSE, GLUF, CALCIUM, CORRECTEDCA, AST, ALT, ALKPHOS, PROT, BILITOT, EGFR  No results found for: IRON, TIBC, FERRITIN  No results found for: LIPASE    Radiology Results:   No results found

## 2020-02-12 NOTE — TELEPHONE ENCOUNTER
Pt's wife states Pt has had sev'l tests and continues w/ pain, hemorrhoids, gas and diarrhea/asks what to do? Pt in background/advised pain #8 is constant  Transf'd call to  for appt today/noted certainly if pain is severe he should consider ER

## 2020-02-12 NOTE — PATIENT INSTRUCTIONS
Arrange for MRE   Check blood work before MRI   Trial Bentyl as needed twice daily for pain   Start taking Amitriptyline 10 mg at night  If you have any side effects of dizziness, lightheadedness, extreme tiredness, please stop taking and notify our office  Follow up in 4-6 weeks

## 2020-04-01 ENCOUNTER — OFFICE VISIT (OUTPATIENT)
Dept: GASTROENTEROLOGY | Facility: CLINIC | Age: 80
End: 2020-04-01
Payer: MEDICARE

## 2020-04-01 VITALS — WEIGHT: 195 LBS | BODY MASS INDEX: 30.61 KG/M2 | HEIGHT: 67 IN

## 2020-04-01 DIAGNOSIS — K63.89 SMALL INTESTINAL BACTERIAL OVERGROWTH: Primary | ICD-10-CM

## 2020-04-01 PROCEDURE — 99214 OFFICE O/P EST MOD 30 MIN: CPT | Performed by: INTERNAL MEDICINE

## 2020-04-01 RX ORDER — METRONIDAZOLE 500 MG/1
500 TABLET ORAL 3 TIMES DAILY
Qty: 42 TABLET | Refills: 0 | Status: SHIPPED | OUTPATIENT
Start: 2020-04-01 | End: 2020-04-15

## 2020-04-01 RX ORDER — CEPHALEXIN 500 MG/1
500 CAPSULE ORAL 3 TIMES DAILY
Qty: 42 CAPSULE | Refills: 0 | Status: SHIPPED | OUTPATIENT
Start: 2020-04-01 | End: 2020-04-15

## 2020-07-16 ENCOUNTER — OFFICE VISIT (OUTPATIENT)
Dept: GASTROENTEROLOGY | Facility: CLINIC | Age: 80
End: 2020-07-16
Payer: MEDICARE

## 2020-07-16 VITALS
BODY MASS INDEX: 30.54 KG/M2 | DIASTOLIC BLOOD PRESSURE: 84 MMHG | TEMPERATURE: 98.3 F | WEIGHT: 195 LBS | SYSTOLIC BLOOD PRESSURE: 158 MMHG

## 2020-07-16 DIAGNOSIS — R19.7 DIARRHEA, UNSPECIFIED TYPE: ICD-10-CM

## 2020-07-16 DIAGNOSIS — K63.89 SMALL INTESTINAL BACTERIAL OVERGROWTH: Primary | ICD-10-CM

## 2020-07-16 DIAGNOSIS — Z12.11 COLON CANCER SCREENING: ICD-10-CM

## 2020-07-16 PROCEDURE — 99214 OFFICE O/P EST MOD 30 MIN: CPT | Performed by: INTERNAL MEDICINE

## 2020-07-16 NOTE — PROGRESS NOTES
[id number] - Outpatient Follow-up Note  Valente Velez 68 y o  male MRN: [id number]  Encounter: [id number]    ASSESSMENT AND PLAN:      1  Small intestinal bacterial overgrowth  Positive breath test not sure his symptoms have responded to antibiotic course x2    2  Colon cancer screening  Up-to-date negative colonoscopy 2019    3  Diarrhea, unspecified type  In talking with him I am not sure I would really classify his symptoms as diarrhea he is having 2-3 broken up stools in the morning and then nothing for the next 20 hours  Extensive negative workup including colonoscopy with biopsy celiac studies all of which were nondiagnostic the only positive test we have is the breath test and his response to antibiotics has been equivocal at best   At this point I am inclined to observe him he has no alarm symptoms  I asked him to contact me if things get worse I will see him in follow-up in about 6 months      Followup Appointment:  6 months  ______________________________________________________________________    Chief Complaint   Patient presents with    GERD     follow up     Diarrhea     HPI:  Doing well the last few weeks  He is not sure if the antibiotics helped his diarrhea or not  In questioning him his symptoms when they do bother him are 2-3 loose bowel movements not watery but broken up in the morning when he gets up  After that he is usually good until the next day  No abdominal pain recently no weight loss  Historical Information   Past Medical History:   Diagnosis Date    Dementia ([address] )     Gastroparesis     GERD (gastroesophageal reflux disease)     Hiatal hernia     Hyperlipidemia     Hypertension     Trigeminal neuralgia of left side of face     from injury     Past Surgical History:   Procedure Laterality Date    CHOLECYSTECTOMY      COLONOSCOPY  12/27/2019    Grade 1 internal hemorrhoids with normal colonic mucosa    Pathology negative for colitis or dysplasia    GASTRIC FUNDOPLICATION      UPPER GASTROINTESTINAL ENDOSCOPY       Social History     Substance and Sexual Activity   Alcohol Use Not Currently    Frequency: 4 or more times a week    Binge frequency: Never     Social History     Substance and Sexual Activity   Drug Use Never     Social History     Tobacco Use   Smoking Status Never Smoker   Smokeless Tobacco Never Used     Family History   Problem Relation Age of Onset    Dementia Mother     Stroke Father     Colon polyps Neg Hx     Colon cancer Neg Hx          Current Outpatient Medications:     amitriptyline (ELAVIL) 10 mg tablet    aspirin (ECOTRIN LOW STRENGTH) 81 mg EC tablet    atorvastatin (LIPITOR) 20 mg tablet    Coenzyme Q10 (CO Q10) 100 MG CAPS    colestipol (COLESTID) 1 g tablet    diphenoxylate-atropine (LOMOTIL) 2 5-0 025 mg per tablet    lamoTRIgine (LaMICtal) 25 mg tablet    memantine (NAMENDA) 10 mg tablet    Multiple Vitamin (MULTIVITAMIN) tablet    tamsulosin (FLOMAX) 0 4 mg    dicyclomine (BENTYL) 10 mg capsule    polyethylene glycol (COLYTE) 4000 mL solution  No Known Allergies  Reviewed medications and allergies and updated as indicated    PHYSICAL EXAM:    Blood pressure 158/84, temperature 98 3 °F (36 8 °C), weight 88 5 kg (195 lb)  Body mass index is 30 54 kg/m²  General Appearance: NAD, cooperative, alert  Eyes: Anicteric, PERRLA, EOMI  ENT:  Normocephalic, atraumatic, normal mucosa  Neck:  Supple, symmetrical, trachea midline  Resp:  Clear to auscultation bilaterally; no rales, rhonchi or wheezing; respirations unlabored   CV:  S1 S2, Regular rate and rhythm; no murmur, rub, or gallop  GI:  Soft, non-tender, non-distended; normal bowel sounds; no masses, no organomegaly   Rectal: Deferred  Musculoskeletal: No cyanosis, clubbing or edema  Normal ROM    Skin:  No jaundice, rashes, or lesions   Heme/Lymph: No palpable cervical lymphadenopathy  Psych: Normal affect, good eye contact  Neuro: No gross deficits, AAOx3    Lab Results:   No results found for: WBC, HGB, HCT, MCV, PLT  No results found for: NA, K, CL, CO2, ANIONGAP, BUN, CREATININE, GLUCOSE, GLUF, CALCIUM, CORRECTEDCA, AST, ALT, ALKPHOS, PROT, BILITOT, EGFR  No results found for: IRON, TIBC, FERRITIN  No results found for: LIPASE    Radiology Results:   No results found

## 2020-10-13 ENCOUNTER — TELEPHONE (OUTPATIENT)
Dept: GASTROENTEROLOGY | Facility: CLINIC | Age: 80
End: 2020-10-13

## 2020-10-16 ENCOUNTER — OFFICE VISIT (OUTPATIENT)
Dept: GASTROENTEROLOGY | Facility: CLINIC | Age: 80
End: 2020-10-16
Payer: MEDICARE

## 2020-10-16 VITALS
DIASTOLIC BLOOD PRESSURE: 80 MMHG | TEMPERATURE: 97.8 F | HEIGHT: 67 IN | SYSTOLIC BLOOD PRESSURE: 122 MMHG | WEIGHT: 195 LBS | BODY MASS INDEX: 30.61 KG/M2 | HEART RATE: 77 BPM

## 2020-10-16 DIAGNOSIS — R10.9 ABDOMINAL PAIN, UNSPECIFIED ABDOMINAL LOCATION: ICD-10-CM

## 2020-10-16 DIAGNOSIS — Z12.11 COLON CANCER SCREENING: ICD-10-CM

## 2020-10-16 DIAGNOSIS — R19.7 DIARRHEA, UNSPECIFIED TYPE: Primary | ICD-10-CM

## 2020-10-16 PROCEDURE — 99213 OFFICE O/P EST LOW 20 MIN: CPT | Performed by: NURSE PRACTITIONER

## 2020-10-16 RX ORDER — MULTIVIT WITH MINERALS/LUTEIN
1000 TABLET ORAL DAILY
COMMUNITY

## 2020-10-16 RX ORDER — DICYCLOMINE HYDROCHLORIDE 10 MG/1
10 CAPSULE ORAL EVERY 6 HOURS PRN
Qty: 90 CAPSULE | Refills: 1 | Status: SHIPPED | OUTPATIENT
Start: 2020-10-16 | End: 2020-11-13

## 2020-10-16 RX ORDER — SIMETHICONE 125 MG
250 TABLET,CHEWABLE ORAL DAILY
COMMUNITY

## 2020-11-13 ENCOUNTER — OFFICE VISIT (OUTPATIENT)
Dept: GASTROENTEROLOGY | Facility: CLINIC | Age: 80
End: 2020-11-13
Payer: MEDICARE

## 2020-11-13 ENCOUNTER — TELEPHONE (OUTPATIENT)
Dept: GASTROENTEROLOGY | Facility: CLINIC | Age: 80
End: 2020-11-13

## 2020-11-13 VITALS
BODY MASS INDEX: 30.98 KG/M2 | HEART RATE: 84 BPM | TEMPERATURE: 97.5 F | SYSTOLIC BLOOD PRESSURE: 138 MMHG | DIASTOLIC BLOOD PRESSURE: 88 MMHG | WEIGHT: 197.4 LBS | HEIGHT: 67 IN

## 2020-11-13 DIAGNOSIS — R10.30 LOWER ABDOMINAL PAIN: ICD-10-CM

## 2020-11-13 DIAGNOSIS — Z12.11 COLON CANCER SCREENING: ICD-10-CM

## 2020-11-13 DIAGNOSIS — K21.9 GASTROESOPHAGEAL REFLUX DISEASE WITHOUT ESOPHAGITIS: ICD-10-CM

## 2020-11-13 DIAGNOSIS — R19.7 DIARRHEA, UNSPECIFIED TYPE: ICD-10-CM

## 2020-11-13 DIAGNOSIS — R10.10 UPPER ABDOMINAL PAIN: Primary | ICD-10-CM

## 2020-11-13 PROCEDURE — 99214 OFFICE O/P EST MOD 30 MIN: CPT | Performed by: NURSE PRACTITIONER

## 2020-11-13 RX ORDER — FAMOTIDINE 40 MG/1
40 TABLET, FILM COATED ORAL
Qty: 30 TABLET | Refills: 2 | Status: SHIPPED | OUTPATIENT
Start: 2020-11-13 | End: 2020-12-14 | Stop reason: SDUPTHER

## 2020-12-14 ENCOUNTER — OFFICE VISIT (OUTPATIENT)
Dept: GASTROENTEROLOGY | Facility: CLINIC | Age: 80
End: 2020-12-14
Payer: MEDICARE

## 2020-12-14 VITALS
HEIGHT: 67 IN | BODY MASS INDEX: 30.92 KG/M2 | HEART RATE: 58 BPM | WEIGHT: 197 LBS | SYSTOLIC BLOOD PRESSURE: 140 MMHG | DIASTOLIC BLOOD PRESSURE: 94 MMHG

## 2020-12-14 DIAGNOSIS — R10.30 LOWER ABDOMINAL PAIN: ICD-10-CM

## 2020-12-14 DIAGNOSIS — Z12.11 COLON CANCER SCREENING: ICD-10-CM

## 2020-12-14 DIAGNOSIS — K21.9 GASTROESOPHAGEAL REFLUX DISEASE WITHOUT ESOPHAGITIS: ICD-10-CM

## 2020-12-14 DIAGNOSIS — R19.7 DIARRHEA, UNSPECIFIED TYPE: Primary | ICD-10-CM

## 2020-12-14 DIAGNOSIS — R10.10 UPPER ABDOMINAL PAIN: ICD-10-CM

## 2020-12-14 PROCEDURE — 99214 OFFICE O/P EST MOD 30 MIN: CPT | Performed by: NURSE PRACTITIONER

## 2020-12-14 RX ORDER — FAMOTIDINE 40 MG/1
40 TABLET, FILM COATED ORAL
Qty: 30 TABLET | Refills: 5 | Status: SHIPPED | OUTPATIENT
Start: 2020-12-14 | End: 2021-04-30

## 2021-04-27 ENCOUNTER — TELEPHONE (OUTPATIENT)
Dept: GASTROENTEROLOGY | Facility: CLINIC | Age: 81
End: 2021-04-27

## 2021-04-27 NOTE — TELEPHONE ENCOUNTER
I called and spoke with Dom, he was seen by his PCP today ( Dr Lisa Hartmann) Blood work was ordered for tomorrow at [facility]  Patient complaining of a HA, mid abd pain left >right Pain "it hurts" same level as umbilicus  discontinued taking the levsin a couple months ago as it was not effective, ongoing diarrhea 2-3 bm's per day (loose watery)  Denies fever, chills, nausea, vomiting, recent travel or covid exposure  I advised we will place patient on waiting list for a sooner appointment than July

## 2021-04-27 NOTE — TELEPHONE ENCOUNTER
Pt's wife, Dominick Bradshaw, called to sched appt/was transf'd to Nrsg line  She states he had an appt but didn't need it so she cancelled it  Now Abd pain ret'd/rates #7 this morn; had called primary, who ord'd fasting BW to be done tomorrow  CB# [phone]

## 2021-04-30 ENCOUNTER — OFFICE VISIT (OUTPATIENT)
Dept: GASTROENTEROLOGY | Facility: CLINIC | Age: 81
End: 2021-04-30
Payer: MEDICARE

## 2021-04-30 VITALS
HEIGHT: 67 IN | BODY MASS INDEX: 31.23 KG/M2 | SYSTOLIC BLOOD PRESSURE: 140 MMHG | DIASTOLIC BLOOD PRESSURE: 82 MMHG | WEIGHT: 199 LBS

## 2021-04-30 DIAGNOSIS — R10.9 RIGHT SIDED ABDOMINAL PAIN: Primary | ICD-10-CM

## 2021-04-30 PROCEDURE — 99214 OFFICE O/P EST MOD 30 MIN: CPT | Performed by: INTERNAL MEDICINE

## 2021-04-30 RX ORDER — DICYCLOMINE HYDROCHLORIDE 10 MG/1
20 CAPSULE ORAL 2 TIMES DAILY
Qty: 60 CAPSULE | Refills: 2 | Status: SHIPPED | OUTPATIENT
Start: 2021-04-30

## 2021-04-30 NOTE — LETTER
April 30, 2021     MD [address]  [address] [facility]    Patient: Bharath Cardenas   YOB: 1940   Date of Visit: 4/30/2021       Dear Dr Wadie Gilford:    Thank you for referring Bharath Cardenas to me for evaluation  Below are my notes for this consultation  If you have questions, please do not hesitate to call me  I look forward to following your patient along with you  Sincerely,        Bridgett Gresham MD        CC: No Recipients  Bridgett Gresham MD  4/30/2021 11:47 AM  Sign when Signing Visit  [facility] - Outpatient Follow-up Note  Bharath Cardenas 80 y o  male MRN: [id number]  Encounter: [id number]    ASSESSMENT AND PLAN:      1  Right sided abdominal pain  Right sided abdominal pain  Probably exacerbation of underlying functional bowel disease more than anything  CT scan from 2019 though with of right inguinal hernia and he is tender in this area  I doubt hernia accounts for all of his GI symptoms though  Blood work normal   Extensive workup in 2019 including colonoscopy, CT scan, breath test, & MR enterography  - CT abdomen pelvis w contrast; Future  - start dicyclomine (BENTYL) 10 mg capsule; Take 2 capsules (20 mg total) by mouth 2 (two) times a day  Dispense: 60 capsule; Refill: 2  - call me next week with an update and to review the role to the CT scan      Followup Appointment:  2 months  ______________________________________________________________________    Chief Complaint   Patient presents with    Abdominal Pain     HPI:  89year-old gentleman well known to our service with a history of functional bowel disease and some component of bacterial overgrowth  He had extensive evaluation in 2019  He was last seen by us in December  At that time he was on Levsin, probiotics, fiber, and Pepcid  Currently he is not taking any of those medications  He is seen in the office secondary to worsening abdominal symptoms    He is somewhat vague with his complaints but it seems like he has increasing issues with lower quadrant abdominal pain  He continues to have E retic bowel movements  He denies any upper GI symptoms  He denies any GI bleeding  His weight is increasing  Generally feels that his abdominal pain is somehow affecting the rest of his body  He was in to see his PCP and had blood work that was normal     Historical Information   Past Medical History:   Diagnosis Date    Dementia ([address] )     Gastroparesis     GERD (gastroesophageal reflux disease)     Hiatal hernia     Hyperlipidemia     Hypertension     Trigeminal neuralgia of left side of face     from injury     Past Surgical History:   Procedure Laterality Date    CHOLECYSTECTOMY      COLONOSCOPY  12/27/2019    Grade 1 internal hemorrhoids with normal colonic mucosa    Pathology negative for colitis or dysplasia    GASTRIC FUNDOPLICATION      UPPER GASTROINTESTINAL ENDOSCOPY       Social History     Substance and Sexual Activity   Alcohol Use Not Currently    Frequency: 4 or more times a week    Binge frequency: Never     Social History     Substance and Sexual Activity   Drug Use Never     Social History     Tobacco Use   Smoking Status Never Smoker   Smokeless Tobacco Never Used     Family History   Problem Relation Age of Onset    Dementia Mother     Stroke Father     Colon polyps Neg Hx     Colon cancer Neg Hx          Current Outpatient Medications:     Ascorbic Acid (vitamin C) 1000 MG tablet    aspirin (ECOTRIN LOW STRENGTH) 81 mg EC tablet    atorvastatin (LIPITOR) 20 mg tablet    B Complex-Folic Acid (B COMPLEX-VITAMIN B12 PO)    Coenzyme Q10 (CO Q10) 100 MG CAPS    hyoscyamine (LEVSIN/SL) 0 125 mg SL tablet    lamoTRIgine (LaMICtal) 25 mg tablet    memantine (NAMENDA) 10 mg tablet    Multiple Vitamin (MULTIVITAMIN) tablet    tamsulosin (FLOMAX) 0 4 mg    dicyclomine (BENTYL) 10 mg capsule    simethicone (MYLICON) 820 MG chewable tablet  No Known Allergies  Reviewed medications and allergies and updated as indicated    PHYSICAL EXAM:    Blood pressure 140/82, height 5' 7" (1 702 m), weight 90 3 kg (199 lb)  Body mass index is 31 17 kg/m²  General Appearance: NAD, cooperative, alert  Eyes: Anicteric, PERRLA, EOMI  ENT:  Normocephalic, atraumatic, normal mucosa  Neck:  Supple, symmetrical, trachea midline  Resp:  Clear to auscultation bilaterally; no rales, rhonchi or wheezing; respirations unlabored   CV:  S1 S2, Regular rate and rhythm; no murmur, rub, or gallop  GI:  Soft, moderate right lower quadrant tenderness without rebound or guarding, non-distended; normal bowel sounds; no masses, no organomegaly   Rectal: Deferred  Musculoskeletal: No cyanosis, clubbing or edema  Normal ROM  Skin:  No jaundice, rashes, or lesions   Heme/Lymph: No palpable cervical lymphadenopathy  Psych: Normal affect, good eye contact  Neuro: No gross deficits, AAOx3    Lab Results:   CBC and CMP normal  (4/26/21)    Radiology Results:   No results found

## 2021-04-30 NOTE — PROGRESS NOTES
[id number] - Outpatient Follow-up Note  Sydnee Snowden 80 y o  male MRN: [id number]  Encounter: [id number]    ASSESSMENT AND PLAN:      1  Right sided abdominal pain  Right sided abdominal pain  Probably exacerbation of underlying functional bowel disease more than anything  CT scan from 2019 though with of right inguinal hernia and he is tender in this area  I doubt hernia accounts for all of his GI symptoms though  Blood work normal   Extensive workup in 2019 including colonoscopy, CT scan, breath test, & MR enterography  - CT abdomen pelvis w contrast; Future  - start dicyclomine (BENTYL) 10 mg capsule; Take 2 capsules (20 mg total) by mouth 2 (two) times a day  Dispense: 60 capsule; Refill: 2  - call me next week with an update and to review the role to the CT scan      Followup Appointment:  2 months  ______________________________________________________________________    Chief Complaint   Patient presents with    Abdominal Pain     HPI:  89year-old gentleman well known to our service with a history of functional bowel disease and some component of bacterial overgrowth  He had extensive evaluation in 2019  He was last seen by us in December  At that time he was on Levsin, probiotics, fiber, and Pepcid  Currently he is not taking any of those medications  He is seen in the office secondary to worsening abdominal symptoms  He is somewhat vague with his complaints but it seems like he has increasing issues with lower quadrant abdominal pain  He continues to have E retic bowel movements  He denies any upper GI symptoms  He denies any GI bleeding  His weight is increasing  Generally feels that his abdominal pain is somehow affecting the rest of his body    He was in to see his PCP and had blood work that was normal     Historical Information   Past Medical History:   Diagnosis Date    Dementia ([address] )     Gastroparesis     GERD (gastroesophageal reflux disease)  Hiatal hernia     Hyperlipidemia     Hypertension     Trigeminal neuralgia of left side of face     from injury     Past Surgical History:   Procedure Laterality Date    CHOLECYSTECTOMY      COLONOSCOPY  12/27/2019    Grade 1 internal hemorrhoids with normal colonic mucosa  Pathology negative for colitis or dysplasia    GASTRIC FUNDOPLICATION      UPPER GASTROINTESTINAL ENDOSCOPY       Social History     Substance and Sexual Activity   Alcohol Use Not Currently    Frequency: 4 or more times a week    Binge frequency: Never     Social History     Substance and Sexual Activity   Drug Use Never     Social History     Tobacco Use   Smoking Status Never Smoker   Smokeless Tobacco Never Used     Family History   Problem Relation Age of Onset    Dementia Mother     Stroke Father     Colon polyps Neg Hx     Colon cancer Neg Hx          Current Outpatient Medications:     Ascorbic Acid (vitamin C) 1000 MG tablet    aspirin (ECOTRIN LOW STRENGTH) 81 mg EC tablet    atorvastatin (LIPITOR) 20 mg tablet    B Complex-Folic Acid (B COMPLEX-VITAMIN B12 PO)    Coenzyme Q10 (CO Q10) 100 MG CAPS    hyoscyamine (LEVSIN/SL) 0 125 mg SL tablet    lamoTRIgine (LaMICtal) 25 mg tablet    memantine (NAMENDA) 10 mg tablet    Multiple Vitamin (MULTIVITAMIN) tablet    tamsulosin (FLOMAX) 0 4 mg    dicyclomine (BENTYL) 10 mg capsule    simethicone (MYLICON) 611 MG chewable tablet  No Known Allergies  Reviewed medications and allergies and updated as indicated    PHYSICAL EXAM:    Blood pressure 140/82, height 5' 7" (1 702 m), weight 90 3 kg (199 lb)  Body mass index is 31 17 kg/m²  General Appearance: NAD, cooperative, alert  Eyes: Anicteric, PERRLA, EOMI  ENT:  Normocephalic, atraumatic, normal mucosa  Neck:  Supple, symmetrical, trachea midline  Resp:  Clear to auscultation bilaterally; no rales, rhonchi or wheezing; respirations unlabored   CV:  S1 S2, Regular rate and rhythm; no murmur, rub, or gallop    GI: Soft, moderate right lower quadrant tenderness without rebound or guarding, non-distended; normal bowel sounds; no masses, no organomegaly   Rectal: Deferred  Musculoskeletal: No cyanosis, clubbing or edema  Normal ROM  Skin:  No jaundice, rashes, or lesions   Heme/Lymph: No palpable cervical lymphadenopathy  Psych: Normal affect, good eye contact  Neuro: No gross deficits, AAOx3    Lab Results:   CBC and CMP normal  (4/26/21)    Radiology Results:   No results found

## 2021-05-07 ENCOUNTER — TELEPHONE (OUTPATIENT)
Dept: GASTROENTEROLOGY | Facility: CLINIC | Age: 81
End: 2021-05-07

## 2021-05-07 NOTE — TELEPHONE ENCOUNTER
Pt's wife, Shona Hallmark, states they were instructed to call and let [facility] know when CT was done - had this morn at [facility]  CB# [phone]

## 2021-05-07 NOTE — TELEPHONE ENCOUNTER
Discussed with patient's wife  Reviewed CT scan  There appears to be an internal hernia but no evidence of obstruction  Multiple other nonacute findings  According to his wife he still has some GI complaint  They have not started the Bentyl  Recommended: Start dicyclomine 20 mg twice a day    Follow the exam   Notify me if he gets worse